# Patient Record
Sex: MALE | Race: WHITE | NOT HISPANIC OR LATINO
[De-identification: names, ages, dates, MRNs, and addresses within clinical notes are randomized per-mention and may not be internally consistent; named-entity substitution may affect disease eponyms.]

---

## 2021-10-20 ENCOUNTER — TRANSCRIPTION ENCOUNTER (OUTPATIENT)
Age: 76
End: 2021-10-20

## 2021-10-26 ENCOUNTER — TRANSCRIPTION ENCOUNTER (OUTPATIENT)
Age: 76
End: 2021-10-26

## 2023-03-13 PROBLEM — Z00.00 ENCOUNTER FOR PREVENTIVE HEALTH EXAMINATION: Status: ACTIVE | Noted: 2023-03-13

## 2023-03-14 ENCOUNTER — APPOINTMENT (OUTPATIENT)
Dept: OTOLARYNGOLOGY | Facility: CLINIC | Age: 78
End: 2023-03-14

## 2023-03-30 ENCOUNTER — APPOINTMENT (OUTPATIENT)
Dept: OTOLARYNGOLOGY | Facility: CLINIC | Age: 78
End: 2023-03-30
Payer: MEDICARE

## 2023-03-30 VITALS — BODY MASS INDEX: 28.32 KG/M2 | HEIGHT: 65 IN | WEIGHT: 170 LBS

## 2023-03-30 PROCEDURE — 31231 NASAL ENDOSCOPY DX: CPT

## 2023-03-30 PROCEDURE — 92557 COMPREHENSIVE HEARING TEST: CPT

## 2023-03-30 PROCEDURE — 92567 TYMPANOMETRY: CPT

## 2023-03-30 PROCEDURE — 99204 OFFICE O/P NEW MOD 45 MIN: CPT | Mod: 25

## 2023-03-30 PROCEDURE — G0268 REMOVAL OF IMPACTED WAX MD: CPT

## 2023-04-05 LAB — EAR NOSE AND THROAT CULTURE: ABNORMAL

## 2023-04-05 NOTE — HISTORY OF PRESENT ILLNESS
[de-identified] : HANNAH SOLOMON is a 78 year old male who comes in complaining of a decrease in hearing in his left ear.  [facility] and [facility] which shows a left-sided acoustic neuroma.  There is a mildly expanded left IAC with trace medial extension through the porous acoustic us.  The largest dimension is 9.6 mm.  He denies any significant vestibular dysfunction.  He complains as well of nasal congestion and the need to clear his throat.  This is more problematic as a valencia.  The MRI report shows maxillary ethmoid and frontal sinus disease with complete opacification of the ethmoids and the right frontal sinus.  The patient had no other ear nose or throat complaints at this visit.

## 2023-04-05 NOTE — ASSESSMENT
[FreeTextEntry1] : It was my impression that he has a left-sided acoustic neuroma, with the largest dimension just under 1 cm.  He has chosen to observe and I felt that made the most sense especially as he already had a 12% discrimination and he will get [facility] over [facility].  Options for treatment were reviewed with him.\par \par He has the complaints of the thick mucus in the throat and his imaging that showed a significant sinusitis.  His exam showed purulence from the left and early polypoid changes.  I recommended using both fluticasone and nasal saline rinses regularly, he does tend to do this already.  I took a culture and suggested a course of cefuroxime pending the culture result.  I would like to see him back in follow-up in 3 to 4 weeks.\par \par As a valencia, I suggested avoiding antihistamines although he could use azelastine if he truly has allergies.  I recommended guaifenesin to help thin the mucus.  We discussed the option of a brief course of prednisone but will hold off\par

## 2023-04-05 NOTE — PHYSICAL EXAM
[FreeTextEntry1] : \par The patient was alert and oriented and in no distress.\par Voice was clear.\par \par Face:\par The patient had no facial asymmetry or mass.\par The skin was unremarkable.\par \par Eyes:\par The pupils were equal round and reactive to light and accommodation.\par There was no significant nystagmus or disconjugate gaze noted.\par \par Nose: \par The external nose had no significant deformity.  There was no facial tenderness.  On anterior rhinoscopy, the nasal mucosa was clear.  The anterior septum was midline.  There were no visualized polyps purulence  or masses.\par \par Oral cavity:\par The oral mucosa was normal.\par The oral and base of tongue were clear and without mass.\par The gingival and buccal mucosa were moist and without lesions.\par The palate moved well.\par There was no cleft to the palate.\par There appeared to be good salivary flow.  \par There was no pus, erythema or mass in the oral cavity.\par \par \par Ears:\par  Procedure note:\par  Removal of Cerumen Impactions, both ears:  48545-47\par Both external ears were normal.\par There were symptomatic cerumen impactions in both ears.  These were cleared microscopically without trauma using both suction and curettes.  After clearing,  both ear canals were clear both eardrums were intact and mobile.\par Neck: \par The neck was symmetrical.\par The parotid and submandibular glands were normal without masses.\par The trachea was midline and there was no unusual crepitus.\par The thyroid was smooth and nontender and no masses were palpated.\par There was no significant cervical adenopathy.\par \par \par Neuro:\par Neurologically, the patient was awake, alert, and oriented to person, place and time. There were no obvious focal neurologic abnormalities.  Cranial nerves II through XII were grossly intact.\par \par \par TMJ:\par The temporomandibular joints were nontender.\par There was no abnormal crepitus and no significant malocclusion [de-identified] : Ears:\par Procedure note: Debridement of cerumen impaction left ear   49262\par \par Dx:  symptomatic cerumen impaction left ear \par Both external ears were normal.\par There was a dense cerumen impaction in the left ear.  This was cleared microscopically without trauma, using suction and curettes.  Beyond that, both ear canals were clear and both eardrums were intact and mobile.\par \par Nasal endoscopy: \par CPT 44948\par Procedure Note:\par \par Endoscopy was done with Covid precautions and with video. All risks and benefits were discussed with the patient and consent obtained.\par \par Nasal endoscopy was done with topical anesthesia of Pontocaine and Afrin and a      nasal endoscope.\par Indication: Nasal congestion, rule out sinusitis.\par Procedure: The nasal cavity was anesthetized with topical Afrin and Pontocaine. An  endoscope was used and inserted into the nasal cavity.\par Attention was first paid to the anterior nasal cavity.\par Endocoscopy was performed to inspect the interior of the nasal cavity, the nasal septum,  the middle and superior meati, the inferior, middle and superior turbinates, and the spheno-ethmoidal  recesses, the nasopharynx and eustachian tube orifices bilaterally. including the nasal mocosa, the possibility of polyps and the consistency of the nasal mucous.\par All findings were normal except:\par The nasal mucosa was boggy with a sharp S-shaped septal deflection.  There were polypoid changes in the left middle meatus greater than the right and a thick yellow discharge in the left middle meatus.  Switching to a rigid 0 degree endoscope the left side was cultured\par \par Flexible fiberoptic laryngoscopy: CPT 33037\par Indications: Dysphagia\par Procedure note:\par \par Flexible fiberoptic laryngoscopy was performed because of dysphagia and because of the patient's inability to tolerate adequate mirror examination.\par \par The nasal cavity was anesthetized with Pontocaine and Afrin.\par The flexible endoscope was placed into the patient's nasal cavity.\par The nasopharynx was without masses.\par The oropharynx, vallecula and base of tongue had no masses.\par The epiglottis, aryepiglottic folds and false vocal cords were normal.\par The pyriform sinuses were without mucosal lesions or pooling of secretions.  \par The laryngeal ventricles were without lesions.\par The visualized subglottis was without mass.\par The lateral and posterior pharyngeal walls were clear and symmetrical.\par The vocal folds were clear and mobile; they abducted and adducted normally.\par There was no interarytenoid mass or fullness.\par \par Endoscopy was done with Covid precautions and with video. All risks and benefits were discussed with the patient and consent obtained.  His base of tongue is soft to palpation.   A complete audiogram was ordered, done and reviewed with the patient.  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

## 2023-04-18 ENCOUNTER — APPOINTMENT (OUTPATIENT)
Dept: OTOLARYNGOLOGY | Facility: CLINIC | Age: 78
End: 2023-04-18
Payer: MEDICARE

## 2023-04-18 VITALS — BODY MASS INDEX: 28.32 KG/M2 | WEIGHT: 170 LBS | HEIGHT: 65 IN

## 2023-04-18 PROCEDURE — 99214 OFFICE O/P EST MOD 30 MIN: CPT | Mod: 25

## 2023-04-18 PROCEDURE — 31231 NASAL ENDOSCOPY DX: CPT

## 2023-04-18 NOTE — HISTORY OF PRESENT ILLNESS
[de-identified] : HANNAH SOLOMON was seen in follow-up on April 18.  Since I had seen him, he got a cross hearing aid.  He has the history of the left-sided acoustic neuroma on imaging at 9.6 mm.  When I saw him last he had a purulent sinusitis and had sinusitis on his MRI.  He notes the nasal congestion and throat clearing are better and that guaifenesin is helping.  The patient had no other ear nose or throat complaints at this visit.

## 2023-04-18 NOTE — HISTORY OF PRESENT ILLNESS
[de-identified] : HANNAH SOLOMON was seen in follow-up on April 18.  Since I had seen him, he got a cross hearing aid.  He has the history of the left-sided acoustic neuroma on imaging at 9.6 mm.  When I saw him last he had a purulent sinusitis and had sinusitis on his MRI.  He notes the nasal congestion and throat clearing are better and that guaifenesin is helping.  The patient had no other ear nose or throat complaints at this visit.

## 2023-04-18 NOTE — PHYSICAL EXAM
[FreeTextEntry1] : General:\par The patient was alert and oriented and in no distress.\par Voice was clear.  He was still clearing his throat but not as frequently\par He is using a cross hearing aid from the left to right\par \par Oral cavity:\par The oral mucosa was normal.\par The oral and base of tongue were clear and without mass.\par The gingival and buccal mucosa were moist and without lesions.\par The palate moved well.\par There was no cleft to the palate.\par There appeared to be good salivary flow.  \par There was no pus, erythema or mass in the oral cavity.\par \par Neck: \par The neck was symmetrical.\par The parotid and submandibular glands were normal without masses.\par The trachea was midline and there was no unusual crepitus.\par The thyroid was smooth and nontender and no masses were palpated.\par There was no significant cervical adenopathy.\par \par Neuro:\par Neurologically, the patient was awake, alert, and oriented to person, place and time. There were no obvious focal neurologic abnormalities.  Cranial nerves II through XII were grossly intact. [de-identified] : Nasal endoscopy: \par CPT 45831\par Procedure Note:\par \par Endoscopy was done with Covid precautions and with video. All risks and benefits were discussed with the patient and consent obtained.\par \par Nasal endoscopy was done with topical anesthesia of Pontocaine and Afrin and a      nasal endoscope.\par Indication: Nasal congestion, rule out sinusitis.\par Procedure: The nasal cavity was anesthetized with topical Afrin and Pontocaine. An  endoscope was used and inserted into the nasal cavity.\par Attention was first paid to the anterior nasal cavity.\par Endocoscopy was performed to inspect the interior of the nasal cavity, the nasal septum,  the middle and superior meati, the inferior, middle and superior turbinates, and the spheno-ethmoidal  recesses, the nasopharynx and eustachian tube orifices bilaterally. including the nasal mocosa, the possibility of polyps and the consistency of the nasal mucous.\par All findings were normal except:\par The nasal mucosa remains boggy.  There were polyps in both middle meati with thick but now clear mucus primarily from the left middle meatus

## 2023-04-18 NOTE — PHYSICAL EXAM
[FreeTextEntry1] : General:\par The patient was alert and oriented and in no distress.\par Voice was clear.  He was still clearing his throat but not as frequently\par He is using a cross hearing aid from the left to right\par \par Oral cavity:\par The oral mucosa was normal.\par The oral and base of tongue were clear and without mass.\par The gingival and buccal mucosa were moist and without lesions.\par The palate moved well.\par There was no cleft to the palate.\par There appeared to be good salivary flow.  \par There was no pus, erythema or mass in the oral cavity.\par \par Neck: \par The neck was symmetrical.\par The parotid and submandibular glands were normal without masses.\par The trachea was midline and there was no unusual crepitus.\par The thyroid was smooth and nontender and no masses were palpated.\par There was no significant cervical adenopathy.\par \par Neuro:\par Neurologically, the patient was awake, alert, and oriented to person, place and time. There were no obvious focal neurologic abnormalities.  Cranial nerves II through XII were grossly intact. [de-identified] : Nasal endoscopy: \par CPT 74315\par Procedure Note:\par \par Endoscopy was done with Covid precautions and with video. All risks and benefits were discussed with the patient and consent obtained.\par \par Nasal endoscopy was done with topical anesthesia of Pontocaine and Afrin and a      nasal endoscope.\par Indication: Nasal congestion, rule out sinusitis.\par Procedure: The nasal cavity was anesthetized with topical Afrin and Pontocaine. An  endoscope was used and inserted into the nasal cavity.\par Attention was first paid to the anterior nasal cavity.\par Endocoscopy was performed to inspect the interior of the nasal cavity, the nasal septum,  the middle and superior meati, the inferior, middle and superior turbinates, and the spheno-ethmoidal  recesses, the nasopharynx and eustachian tube orifices bilaterally. including the nasal mocosa, the possibility of polyps and the consistency of the nasal mucous.\par All findings were normal except:\par The nasal mucosa remains boggy.  There were polyps in both middle meati with thick but now clear mucus primarily from the left middle meatus

## 2023-04-18 NOTE — ASSESSMENT
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  At this point, he was feeling much better however.  In any case, I recommended a course of prednisone for 5 days–risks and benefits and suggested continuing on both the nasal steroid spray and his sinus lavage and would like to see him back in follow-up.  I am not certain that I would recommend further aggressive treatment if he feels well and is doing well.

## 2023-05-18 ENCOUNTER — APPOINTMENT (OUTPATIENT)
Dept: OTOLARYNGOLOGY | Facility: CLINIC | Age: 78
End: 2023-05-18
Payer: MEDICARE

## 2023-05-18 PROCEDURE — 31231 NASAL ENDOSCOPY DX: CPT

## 2023-05-18 PROCEDURE — 99214 OFFICE O/P EST MOD 30 MIN: CPT | Mod: 25

## 2023-05-18 PROCEDURE — 69210 REMOVE IMPACTED EAR WAX UNI: CPT

## 2023-05-18 RX ORDER — CEFUROXIME AXETIL 250 MG/1
250 TABLET ORAL
Qty: 20 | Refills: 1 | Status: COMPLETED | COMMUNITY
Start: 2023-03-30 | End: 2023-05-18

## 2023-05-18 RX ORDER — PREDNISONE 20 MG/1
20 TABLET ORAL
Qty: 10 | Refills: 0 | Status: COMPLETED | COMMUNITY
Start: 2023-04-18 | End: 2023-05-18

## 2023-05-18 NOTE — ASSESSMENT
[FreeTextEntry1] : It was my impression that the patient had a cerumen impaction that was cleared.  I recommended topical moisturizing.  I recommended avoiding Q-tips.  I reviewed aural hygiene and the role of cerumen with the patient.  I suggested a repeat visit in a year or earlier should the need arise.\par His polypoid sinusitis had responded to medical therapy but I recommended continuing to use his nasal steroids and would like to reevaluate in 6 months to make sure this does not recur\par He has the profound hearing loss in the left ear and is using CROS aids that are ITE.  I explained that may be why the right ear feels worse with the hearing aids.  I suggested having his hearing aids evaluated and adjusted as needed.

## 2023-05-18 NOTE — PHYSICAL EXAM
[FreeTextEntry1] : \par The patient was alert and oriented and in no distress.\par Voice was clear.\par \par Face:\par The patient had no facial asymmetry or mass.\par The skin was unremarkable.\par \par Eyes:\par The pupils were equal round and reactive to light and accommodation.\par There was no significant nystagmus or disconjugate gaze noted.\par \par Nose: \par The external nose had no significant deformity.  There was no facial tenderness.  On anterior rhinoscopy, the nasal mucosa was clear.  The anterior septum was midline.  There were no visualized polyps purulence  or masses.\par \par Oral cavity:\par The oral mucosa was normal.\par The oral and base of tongue were clear and without mass.\par The gingival and buccal mucosa were moist and without lesions.\par The palate moved well.\par There was no cleft to the palate.\par There appeared to be good salivary flow.  \par There was no pus, erythema or mass in the oral cavity.\par \par \par Ears:\par  Procedure note:\par  Removal of Cerumen Impactions, both ears:  14352-26\par Both external ears were normal.\par There were symptomatic cerumen impactions in both ears.  These were cleared microscopically without trauma using both suction and curettes.  After clearing,  both ear canals were clear both eardrums were intact and mobile.\par \par \par Neck: \par The neck was symmetrical.\par The parotid and submandibular glands were normal without masses.\par The trachea was midline and there was no unusual crepitus.\par The thyroid was smooth and nontender and no masses were palpated.\par There was no significant cervical adenopathy.\par \par \par Neuro:\par Neurologically, the patient was awake, alert, and oriented to person, place and time. There were no obvious focal neurologic abnormalities.  Cranial nerves II through XII were grossly intact.\par \par \par TMJ:\par The temporomandibular joints were nontender.\par There was no abnormal crepitus and no significant malocclusion\par \par \par Nasal endoscopy: \par CPT 05542\par Procedure Note:\par \par Endoscopy was done with Covid precautions and with video. All risks and benefits were discussed with the patient and consent obtained.\par \par Nasal endoscopy was done with topical anesthesia of Pontocaine and Afrin and a      nasal endoscope.\par Indication: Sinusitis with nasal polyps evaluation of response to therapy procedure: The nasal cavity was anesthetized with topical Afrin and Pontocaine. An  endoscope was used and inserted into the nasal cavity.\par Attention was first paid to the anterior nasal cavity.\par Endocoscopy was performed to inspect the interior of the nasal cavity, the nasal septum,  the middle and superior meati, the inferior, middle and superior turbinates, and the spheno-ethmoidal  recesses, the nasopharynx and eustachian tube orifices bilaterally. including the nasal mocosa, the possibility of polyps and the consistency of the nasal mucous.\par All findings were normal except:\par The mucosa still was somewhat boggy but the polypoid changes had resolved and there was no further purulence\par

## 2023-05-18 NOTE — HISTORY OF PRESENT ILLNESS
[de-identified] : HANNAH SOLOMON was seen in follow-up on May 18.  He was doing much better.  His nasal airway was improved the postnasal drip was better and he had no problems with the brief course of prednisone.  He though, is concerned about his hearing aids and finds that he hears better when he takes his right hearing aid out.  The patient had no other ear nose or throat complaints at this visit.

## 2023-10-05 ENCOUNTER — APPOINTMENT (OUTPATIENT)
Dept: OTOLARYNGOLOGY | Facility: CLINIC | Age: 78
End: 2023-10-05
Payer: MEDICARE

## 2023-10-05 VITALS — BODY MASS INDEX: 28.32 KG/M2 | WEIGHT: 170 LBS | HEIGHT: 65 IN

## 2023-10-05 DIAGNOSIS — Z86.39 PERSONAL HISTORY OF OTHER ENDOCRINE, NUTRITIONAL AND METABOLIC DISEASE: ICD-10-CM

## 2023-10-05 PROCEDURE — 31231 NASAL ENDOSCOPY DX: CPT

## 2023-10-05 PROCEDURE — 99213 OFFICE O/P EST LOW 20 MIN: CPT | Mod: 25

## 2023-10-05 RX ORDER — FINASTERIDE 1 MG/1
TABLET ORAL
Refills: 0 | Status: ACTIVE | COMMUNITY

## 2023-10-05 RX ORDER — DULOXETINE HYDROCHLORIDE 30 MG/1
CAPSULE, DELAYED RELEASE ORAL
Refills: 0 | Status: ACTIVE | COMMUNITY

## 2023-10-05 RX ORDER — ROSUVASTATIN CALCIUM 5 MG/1
TABLET, FILM COATED ORAL
Refills: 0 | Status: ACTIVE | COMMUNITY

## 2023-10-05 RX ORDER — GUAIFENESIN 400 MG/1
TABLET ORAL
Refills: 0 | Status: ACTIVE | COMMUNITY

## 2023-10-05 RX ORDER — BUPROPION HYDROCHLORIDE 100 MG/1
TABLET, FILM COATED ORAL
Refills: 0 | Status: ACTIVE | COMMUNITY

## 2023-10-05 RX ORDER — TAMSULOSIN HYDROCHLORIDE 0.4 MG/1
CAPSULE ORAL
Refills: 0 | Status: ACTIVE | COMMUNITY

## 2023-10-05 RX ORDER — BUDESONIDE 0.5 MG/2ML
0.5 INHALANT ORAL TWICE DAILY
Qty: 6 | Refills: 3 | Status: ACTIVE | COMMUNITY
Start: 2023-10-05 | End: 1900-01-01

## 2023-11-02 ENCOUNTER — APPOINTMENT (OUTPATIENT)
Dept: OTOLARYNGOLOGY | Facility: CLINIC | Age: 78
End: 2023-11-02
Payer: MEDICARE

## 2023-11-02 VITALS — BODY MASS INDEX: 28.32 KG/M2 | WEIGHT: 170 LBS | HEIGHT: 65 IN

## 2023-11-02 DIAGNOSIS — H61.23 IMPACTED CERUMEN, BILATERAL: ICD-10-CM

## 2023-11-02 PROCEDURE — 31231 NASAL ENDOSCOPY DX: CPT

## 2023-11-02 PROCEDURE — 99214 OFFICE O/P EST MOD 30 MIN: CPT | Mod: 25

## 2023-11-02 PROCEDURE — 69210 REMOVE IMPACTED EAR WAX UNI: CPT

## 2023-11-07 ENCOUNTER — OUTPATIENT (OUTPATIENT)
Dept: OUTPATIENT SERVICES | Facility: HOSPITAL | Age: 78
LOS: 1 days | End: 2023-11-07

## 2023-11-07 ENCOUNTER — APPOINTMENT (OUTPATIENT)
Dept: CT IMAGING | Facility: CLINIC | Age: 78
End: 2023-11-07
Payer: MEDICARE

## 2023-11-07 PROCEDURE — 70486 CT MAXILLOFACIAL W/O DYE: CPT | Mod: 26,MH

## 2023-11-28 ENCOUNTER — APPOINTMENT (OUTPATIENT)
Dept: OTOLARYNGOLOGY | Facility: CLINIC | Age: 78
End: 2023-11-28
Payer: MEDICARE

## 2023-11-28 DIAGNOSIS — J32.9 CHRONIC SINUSITIS, UNSPECIFIED: ICD-10-CM

## 2023-11-28 PROCEDURE — 99214 OFFICE O/P EST MOD 30 MIN: CPT | Mod: 25

## 2023-11-28 PROCEDURE — 31231 NASAL ENDOSCOPY DX: CPT

## 2023-12-04 ENCOUNTER — APPOINTMENT (OUTPATIENT)
Dept: OPHTHALMOLOGY | Facility: CLINIC | Age: 78
End: 2023-12-04
Payer: MEDICARE

## 2023-12-04 ENCOUNTER — NON-APPOINTMENT (OUTPATIENT)
Age: 78
End: 2023-12-04

## 2023-12-04 PROCEDURE — 92004 COMPRE OPH EXAM NEW PT 1/>: CPT

## 2023-12-04 PROCEDURE — 92136 OPHTHALMIC BIOMETRY: CPT

## 2023-12-11 ENCOUNTER — NON-APPOINTMENT (OUTPATIENT)
Age: 78
End: 2023-12-11

## 2023-12-11 ENCOUNTER — APPOINTMENT (OUTPATIENT)
Dept: OPHTHALMOLOGY | Facility: CLINIC | Age: 78
End: 2023-12-11
Payer: MEDICARE

## 2023-12-11 PROCEDURE — 92012 INTRM OPH EXAM EST PATIENT: CPT

## 2024-01-10 ENCOUNTER — TRANSCRIPTION ENCOUNTER (OUTPATIENT)
Age: 79
End: 2024-01-10

## 2024-01-10 RX ORDER — ACETAMINOPHEN AND CODEINE PHOSPHATE 300; 15 MG/1; MG/1
300-15 TABLET ORAL
Qty: 12 | Refills: 0 | Status: ACTIVE | COMMUNITY
Start: 2024-01-10 | End: 1900-01-01

## 2024-01-10 RX ORDER — PREDNISONE 20 MG/1
20 TABLET ORAL
Qty: 16 | Refills: 0 | Status: ACTIVE | COMMUNITY
Start: 2023-10-05 | End: 1900-01-01

## 2024-01-10 RX ORDER — CEFUROXIME AXETIL 250 MG/1
250 TABLET ORAL
Qty: 20 | Refills: 1 | Status: ACTIVE | COMMUNITY
Start: 2024-01-10 | End: 1900-01-01

## 2024-01-10 NOTE — ASU PATIENT PROFILE, ADULT - NSICDXPASTMEDICALHX_GEN_ALL_CORE_FT
PAST MEDICAL HISTORY:  CAD (coronary artery disease)     CRD (chronic renal disease), stage III     Hearing loss left ear    History of BPH     Hypertension     Nasal polyps     Osteoarthritis left knee     PAST MEDICAL HISTORY:  Bilateral cataracts     CAD (coronary artery disease)     CRD (chronic renal disease), stage III     Hearing loss left ear    History of BPH     History of sinusitis     Hypertension     Nasal polyps     Osteoarthritis left knee

## 2024-01-10 NOTE — ASU PATIENT PROFILE, ADULT - FALL HARM RISK - UNIVERSAL INTERVENTIONS
Bed in lowest position, wheels locked, appropriate side rails in place/Call bell, personal items and telephone in reach/Instruct patient to call for assistance before getting out of bed or chair/Non-slip footwear when patient is out of bed/[place] to call system/Physically safe environment - no spills, clutter or unnecessary equipment/Purposeful Proactive Rounding/Room/bathroom lighting operational, light cord in reach Bed in lowest position, wheels locked, appropriate side rails in place/Call bell, personal items and telephone in reach/Instruct patient to call for assistance before getting out of bed or chair/Non-slip footwear when patient is out of bed/[place] to call system/Physically safe environment - no spills, clutter or unnecessary equipment/Purposeful Proactive Rounding/Room/bathroom lighting operational, light cord in reach

## 2024-01-10 NOTE — ASU PATIENT PROFILE, ADULT - NS PREOP UNDERSTANDS INFO
no solid food for 8 hours before surgery, no chewing gums or hard candy while NPO/ wear loose comfortable fitting  clothes/ no lotions/ perfume/ jewelry or make up/yes

## 2024-01-11 ENCOUNTER — RESULT REVIEW (OUTPATIENT)
Age: 79
End: 2024-01-11

## 2024-01-11 ENCOUNTER — TRANSCRIPTION ENCOUNTER (OUTPATIENT)
Age: 79
End: 2024-01-11

## 2024-01-11 ENCOUNTER — OUTPATIENT (OUTPATIENT)
Dept: OUTPATIENT SERVICES | Facility: HOSPITAL | Age: 79
LOS: 1 days | Discharge: HOME | End: 2024-01-11
Payer: MEDICARE

## 2024-01-11 ENCOUNTER — APPOINTMENT (OUTPATIENT)
Dept: OTOLARYNGOLOGY | Facility: AMBULATORY SURGERY CENTER | Age: 79
End: 2024-01-11

## 2024-01-11 VITALS
HEART RATE: 58 BPM | RESPIRATION RATE: 16 BRPM | OXYGEN SATURATION: 97 % | DIASTOLIC BLOOD PRESSURE: 93 MMHG | TEMPERATURE: 98 F | HEIGHT: 65 IN | SYSTOLIC BLOOD PRESSURE: 177 MMHG | WEIGHT: 173.5 LBS

## 2024-01-11 VITALS
TEMPERATURE: 98 F | DIASTOLIC BLOOD PRESSURE: 71 MMHG | HEART RATE: 71 BPM | OXYGEN SATURATION: 97 % | SYSTOLIC BLOOD PRESSURE: 129 MMHG | RESPIRATION RATE: 16 BRPM

## 2024-01-11 DIAGNOSIS — Z95.5 PRESENCE OF CORONARY ANGIOPLASTY IMPLANT AND GRAFT: Chronic | ICD-10-CM

## 2024-01-11 DIAGNOSIS — Z90.89 ACQUIRED ABSENCE OF OTHER ORGANS: Chronic | ICD-10-CM

## 2024-01-11 PROCEDURE — 30520 REPAIR OF NASAL SEPTUM: CPT

## 2024-01-11 PROCEDURE — 61782 SCAN PROC CRANIAL EXTRA: CPT

## 2024-01-11 PROCEDURE — 30140 RESECT INFERIOR TURBINATE: CPT | Mod: 50

## 2024-01-11 PROCEDURE — 31276 NSL/SINS NDSC FRNT TISS RMVL: CPT | Mod: 50

## 2024-01-11 PROCEDURE — 88311 DECALCIFY TISSUE: CPT | Mod: 26

## 2024-01-11 PROCEDURE — 31267 ENDOSCOPY MAXILLARY SINUS: CPT | Mod: 50

## 2024-01-11 PROCEDURE — 88305 TISSUE EXAM BY PATHOLOGIST: CPT | Mod: 26

## 2024-01-11 PROCEDURE — 31259 NSL/SINS NDSC SPHN TISS RMVL: CPT | Mod: 50

## 2024-01-11 DEVICE — STENT DRUG ELUTING SINUS INTERSECT ENT PROPEL MINI 16MM: Type: IMPLANTABLE DEVICE | Status: FUNCTIONAL

## 2024-01-11 DEVICE — STAPLER SEPTAL ENTACT: Type: IMPLANTABLE DEVICE | Status: FUNCTIONAL

## 2024-01-11 DEVICE — PACKING AND STENT NOVAPACK NASAL SINUS: Type: IMPLANTABLE DEVICE | Status: FUNCTIONAL

## 2024-01-11 RX ORDER — ROSUVASTATIN CALCIUM 5 MG/1
1 TABLET ORAL
Refills: 0 | DISCHARGE

## 2024-01-11 RX ORDER — SODIUM CHLORIDE 9 MG/ML
500 INJECTION, SOLUTION INTRAVENOUS ONCE
Refills: 0 | Status: COMPLETED | OUTPATIENT
Start: 2024-01-11 | End: 2024-01-11

## 2024-01-11 RX ORDER — OXYCODONE HYDROCHLORIDE 5 MG/1
5 TABLET ORAL ONCE
Refills: 0 | Status: DISCONTINUED | OUTPATIENT
Start: 2024-01-11 | End: 2024-01-11

## 2024-01-11 RX ORDER — LISINOPRIL/HYDROCHLOROTHIAZIDE 10-12.5 MG
1 TABLET ORAL
Refills: 0 | DISCHARGE

## 2024-01-11 RX ORDER — CARVEDILOL PHOSPHATE 80 MG/1
1 CAPSULE, EXTENDED RELEASE ORAL
Refills: 0 | DISCHARGE

## 2024-01-11 RX ORDER — DULOXETINE HYDROCHLORIDE 30 MG/1
1 CAPSULE, DELAYED RELEASE ORAL
Refills: 0 | DISCHARGE

## 2024-01-11 RX ORDER — FENTANYL CITRATE 50 UG/ML
25 INJECTION INTRAVENOUS
Refills: 0 | Status: DISCONTINUED | OUTPATIENT
Start: 2024-01-11 | End: 2024-01-11

## 2024-01-11 RX ORDER — BUPROPION HYDROCHLORIDE 150 MG/1
1 TABLET, EXTENDED RELEASE ORAL
Refills: 0 | DISCHARGE

## 2024-01-11 RX ORDER — SODIUM CHLORIDE 9 MG/ML
500 INJECTION, SOLUTION INTRAVENOUS
Refills: 0 | Status: DISCONTINUED | OUTPATIENT
Start: 2024-01-11 | End: 2024-01-11

## 2024-01-11 RX ORDER — FINASTERIDE 5 MG/1
1 TABLET, FILM COATED ORAL
Refills: 0 | DISCHARGE

## 2024-01-11 RX ORDER — OXYMETAZOLINE HYDROCHLORIDE 0.5 MG/ML
2 SPRAY NASAL ONCE
Refills: 0 | Status: COMPLETED | OUTPATIENT
Start: 2024-01-11 | End: 2024-01-11

## 2024-01-11 RX ORDER — BUDESONIDE, MICRONIZED 100 %
2 POWDER (GRAM) MISCELLANEOUS
Refills: 0 | DISCHARGE

## 2024-01-11 RX ORDER — FLUTICASONE PROPIONATE 220 MCG
1 AEROSOL WITH ADAPTER (GRAM) INHALATION
Refills: 0 | DISCHARGE

## 2024-01-11 RX ORDER — HYDROMORPHONE HYDROCHLORIDE 2 MG/ML
0.25 INJECTION INTRAMUSCULAR; INTRAVENOUS; SUBCUTANEOUS
Refills: 0 | Status: DISCONTINUED | OUTPATIENT
Start: 2024-01-11 | End: 2024-01-11

## 2024-01-11 RX ADMIN — OXYMETAZOLINE HYDROCHLORIDE 2 SPRAY(S): 0.5 SPRAY NASAL at 13:55

## 2024-01-11 RX ADMIN — SODIUM CHLORIDE 500 MILLILITER(S): 9 INJECTION, SOLUTION INTRAVENOUS at 17:00

## 2024-01-11 NOTE — ASU DISCHARGE PLAN (ADULT/PEDIATRIC) - NS MD DC FALL RISK RISK
For information on Fall & Injury Prevention, visit: [email or web address] OR  [email or web address] OR  https://www.cdc.gov/steadi/patient.html For information on Fall & Injury Prevention, visit: [email or web address] OR  [email or web address] OR  https://www.cdc.gov/steadi/patient.html

## 2024-01-11 NOTE — PRE-ANESTHESIA EVALUATION ADULT - NSANTHADDINFOFT_GEN_ALL_CORE
Pt accepts all anesthesia risks IBNLT: Dental, Pharyngeal, Laryngeal, Tracheal Trauma, Sore Throat, Hoarseness, Recurrent Laryngeal Nerve Dysfunction, Upper and Lower Extremity Paresthesias, Nausea and Vomiting, Potential exacerbation of asthma and VANNESA.

## 2024-01-12 NOTE — PHYSICAL EXAM
[FreeTextEntry1] : General: The patient was alert and oriented and in no distress. Voice was clear.  He sounded moderately congested  Oral cavity: The oral mucosa was normal. The oral and base of tongue were clear and without mass. The gingival and buccal mucosa were moist and without lesions. The palate moved well. There was no cleft to the palate. There appeared to be good salivary flow.   There was no pus, erythema or mass in the oral cavity.   Nasal endoscopy:  CPT 06888 Procedure Note:  Endoscopy was done with Covid precautions and with video. All risks and benefits were discussed with the patient and consent obtained.  Nasal endoscopy was done with topical anesthesia of Pontocaine and Afrin and a      nasal endoscope. Indication: Nasal congestion, rule out sinusitis. Procedure: The nasal cavity was anesthetized with topical Afrin and Pontocaine. An  endoscope was used and inserted into the nasal cavity. Attention was first paid to the anterior nasal cavity. Endocoscopy was performed to inspect the interior of the nasal cavity, the nasal septum,  the middle and superior meati, the inferior, middle and superior turbinates, and the spheno-ethmoidal  recesses, the nasopharynx and eustachian tube orifices bilaterally. including the nasal mocosa, the possibility of polyps and the consistency of the nasal mucous. All findings were normal except: This was done with a CT for guidance. This showed the nasal mucosa was boggy.  He had inferior turbinate hypertrophy.  He has a right septal deflection and pansinusitis with pansinus opacification on imaging and polyps extending to the lower level of the middle turbinates bilaterally 
(1) constantly moist

## 2024-01-12 NOTE — ADDENDUM
[FreeTextEntry1] : endoscopic sinus surgery, septoplasty and turbinate reduction on 1/11/24.   Finding of right septal deflection (septoplasty only and polyps filling aprx 50 % of nasal airway and all of the sinuses.  Sphenoid opened widely trans ethmoidally with polyp resection and signficant polypoid changes in both antra removed with 30 degree shaver- as well as significant frontal and all ethmoids.   no complications-  mini propels bilaterally-   1/12/24.  phone call-  message left.   RLP

## 2024-01-12 NOTE — HISTORY OF PRESENT ILLNESS
[de-identified] : HANNAH SOLOMON was seen in follow-up on November 28.  Since I had seen him he had sinus imaging and comes in for repeat evaluation.  He is using the budesonide nasal spray but was unable to get budesonide for rinsing.  He comes in for repeat evaluation

## 2024-01-12 NOTE — ASSESSMENT
[FreeTextEntry1] : It was my impression that he has a pansinusitis with pansinus and nasal polyposis in spite of aggressive medical management.  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  I would recommend endoscopic sinus surgery including sphenoidotomies with endoscopic polyp resections septoplasty and inferior turbinate reductions.  All risks and benefits were discussed with the patient at length including the possibility or even probability of recurrence.  The need to continue on medication after his procedure and I would probably pretreat with steroids.  The risk of visual loss and CSF leak were also reviewed as well as hemorrhage.  I will put him back on budesonide nasal spray after surgery and he may need to get the budesonide rinse or even Dupixent depending on his course

## 2024-01-16 ENCOUNTER — APPOINTMENT (OUTPATIENT)
Dept: OTOLARYNGOLOGY | Facility: CLINIC | Age: 79
End: 2024-01-16
Payer: MEDICARE

## 2024-01-16 VITALS — BODY MASS INDEX: 28.32 KG/M2 | WEIGHT: 170 LBS | HEIGHT: 65 IN

## 2024-01-16 DIAGNOSIS — J32.2 CHRONIC ETHMOIDAL SINUSITIS: ICD-10-CM

## 2024-01-16 DIAGNOSIS — J34.3 HYPERTROPHY OF NASAL TURBINATES: ICD-10-CM

## 2024-01-16 DIAGNOSIS — J32.1 CHRONIC FRONTAL SINUSITIS: ICD-10-CM

## 2024-01-16 DIAGNOSIS — J32.3 CHRONIC SPHENOIDAL SINUSITIS: ICD-10-CM

## 2024-01-16 DIAGNOSIS — J34.2 DEVIATED NASAL SEPTUM: ICD-10-CM

## 2024-01-16 DIAGNOSIS — J32.0 CHRONIC MAXILLARY SINUSITIS: ICD-10-CM

## 2024-01-16 PROBLEM — N18.30 CHRONIC KIDNEY DISEASE, STAGE 3 UNSPECIFIED: Chronic | Status: ACTIVE | Noted: 2024-01-11

## 2024-01-16 PROBLEM — I10 ESSENTIAL (PRIMARY) HYPERTENSION: Chronic | Status: ACTIVE | Noted: 2024-01-11

## 2024-01-16 PROBLEM — I25.10 ATHEROSCLEROTIC HEART DISEASE OF NATIVE CORONARY ARTERY WITHOUT ANGINA PECTORIS: Chronic | Status: ACTIVE | Noted: 2024-01-11

## 2024-01-16 PROBLEM — J33.9 NASAL POLYP, UNSPECIFIED: Chronic | Status: ACTIVE | Noted: 2024-01-11

## 2024-01-16 PROBLEM — H26.9 UNSPECIFIED CATARACT: Chronic | Status: ACTIVE | Noted: 2024-01-11

## 2024-01-16 PROBLEM — Z87.09 PERSONAL HISTORY OF OTHER DISEASES OF THE RESPIRATORY SYSTEM: Chronic | Status: ACTIVE | Noted: 2024-01-11

## 2024-01-16 PROBLEM — Z87.438 PERSONAL HISTORY OF OTHER DISEASES OF MALE GENITAL ORGANS: Chronic | Status: ACTIVE | Noted: 2024-01-11

## 2024-01-16 PROBLEM — M19.90 UNSPECIFIED OSTEOARTHRITIS, UNSPECIFIED SITE: Chronic | Status: ACTIVE | Noted: 2024-01-11

## 2024-01-16 PROBLEM — H91.90 UNSPECIFIED HEARING LOSS, UNSPECIFIED EAR: Chronic | Status: ACTIVE | Noted: 2024-01-11

## 2024-01-16 LAB
SURGICAL PATHOLOGY STUDY: SIGNIFICANT CHANGE UP
SURGICAL PATHOLOGY STUDY: SIGNIFICANT CHANGE UP

## 2024-01-16 PROCEDURE — 99024 POSTOP FOLLOW-UP VISIT: CPT

## 2024-01-16 NOTE — HISTORY OF PRESENT ILLNESS
[de-identified] : HANNAH SOLOMON was seen in follow-up on January 16 for his first postoperative debridement.  He is status postendoscopic sinus surgery, septoplasty and turbinate reduction on 1/11/24. Finding of right septal deflection (septoplasty only and polyps filling aprx 50 % of nasal airway and all of the sinuses. Sphenoid opened widely trans ethmoidally with polyp resection and signficant polypoid changes in both antra removed with 30 degree shaver- as well as significant frontal and all ethmoids. no complications- mini propels bilaterally-

## 2024-01-16 NOTE — PHYSICAL EXAM
[FreeTextEntry1] : The patient returns for postoperative sinus debridement CPT 63920-81.  Procedure note:  The nasal cavity was anesthetized topically and locally.  Both rigid 0 and 30 endoscopes were used for debridement.  There was no evidence of hematoma.  Using straight and curved suctions and straight and 30 up-biting forceps, the nasal cavity and sinuses were debrided anteriorly.  The patient appeared to be having an excellent result.  Propel was left in place.  There were no complications.  I placed the patient on nasal steroids and hypertonic saline rinses and will see the patient back in 7-10 days for a repeat middle meatal debridement.  I sent the prescription to [facility] for budesonide to use as a rinse instead when available

## 2024-01-19 RX ORDER — BUDESONIDE 0.5 MG/2ML
0.5 INHALANT ORAL TWICE DAILY
Qty: 60 | Refills: 3 | Status: ACTIVE | COMMUNITY
Start: 2024-01-19 | End: 1900-01-01

## 2024-02-01 ENCOUNTER — APPOINTMENT (OUTPATIENT)
Dept: OTOLARYNGOLOGY | Facility: CLINIC | Age: 79
End: 2024-02-01
Payer: MEDICARE

## 2024-02-01 DIAGNOSIS — J45.909 UNSPECIFIED ASTHMA, UNCOMPLICATED: ICD-10-CM

## 2024-02-01 PROCEDURE — 99024 POSTOP FOLLOW-UP VISIT: CPT

## 2024-02-01 NOTE — PHYSICAL EXAM
[FreeTextEntry1] : General: The patient was alert and oriented and in no distress. Voice was clear.  Face: The patient had no facial asymmetry or mass. The skin was unremarkable.  The patient is status post endoscopic sinus surgery and returns for middle meatal debridement.  Procedure note:  53572-44  The nasal cavity was anesthetized topically and locally.  Using a rigid 0 and a rigid 30 endoscope, using both straight and curved suction iand a straight and the 30 up-biting forceps, both middle meati were suctioned and debrided to clear.  The ethmoids, antra and frontal recesses appeared to be having an excellent result.  The septum was midline and without hematoma.  The inferior turbinates were debrided again.  I recommended continuing on budesonide rinses I recommended a repeat evaluation and cleaning in 3 weeks.

## 2024-02-01 NOTE — HISTORY OF PRESENT ILLNESS
[de-identified] : HANNAH SOLOMON Was seen in follow-up on February 1 for his second postoperative debridement.  He is doing well and notes significant improvement in his airway and symptoms He is status postendoscopic sinus surgery, septoplasty and turbinate reduction on 1/11/24. Finding of right septal deflection (septoplasty only and polyps filling aprx 50 % of nasal airway and all of the sinuses. Sphenoid opened widely trans ethmoidally with polyp resection and signficant polypoid changes in both antra removed with 30 degree shaver- as well as significant frontal and all ethmoids. no complications- mini propels bilaterally-

## 2024-02-13 ENCOUNTER — APPOINTMENT (OUTPATIENT)
Dept: OPHTHALMOLOGY | Facility: AMBULATORY SURGERY CENTER | Age: 79
End: 2024-02-13

## 2024-02-14 ENCOUNTER — APPOINTMENT (OUTPATIENT)
Dept: OPHTHALMOLOGY | Facility: CLINIC | Age: 79
End: 2024-02-14

## 2024-02-20 ENCOUNTER — APPOINTMENT (OUTPATIENT)
Dept: OTOLARYNGOLOGY | Facility: CLINIC | Age: 79
End: 2024-02-20
Payer: MEDICARE

## 2024-02-20 PROCEDURE — 99024 POSTOP FOLLOW-UP VISIT: CPT

## 2024-02-20 NOTE — HISTORY OF PRESENT ILLNESS
[de-identified] : HANNAH SOLOMON Was seen in follow-up on February 20 for his third postoperative debridement.  He is doing well and notes significant improvement in his airway and symptoms He is status postendoscopic sinus surgery, septoplasty and turbinate reduction on 1/11/24. Finding of right septal deflection (septoplasty only and polyps filling aprx 50 % of nasal airway and all of the sinuses. Sphenoid opened widely trans ethmoidally with polyp resection and signficant polypoid changes in both antra removed with 30 degree shaver- as well as significant frontal and all ethmoids. no complications- mini propels bilaterally-

## 2024-02-20 NOTE — PHYSICAL EXAM
[FreeTextEntry1] : General: The patient was alert and oriented and in no distress. Voice was clear.  Face: The patient had no facial asymmetry or mass. The skin was unremarkable.  The patient is status post endoscopic sinus surgery and returns for middle meatal debridement.  Procedure note:  68865-32  The nasal cavity was anesthetized topically and locally.  Using a rigid 0 and a rigid 30 endoscope, using both straight and curved suction iand a straight and the 30 up-biting forceps, both middle meati were suctioned and debrided to clear.  The ethmoids, antra and frontal recesses appeared to be having an excellent result.  The septum was midline and without hematoma.  The inferior turbinates were debrided again.  I recommended continuing on budesonide rinses I recommended a repeat evaluation and cleaning in 6 weeks.

## 2024-02-22 ENCOUNTER — APPOINTMENT (OUTPATIENT)
Dept: OPHTHALMOLOGY | Facility: CLINIC | Age: 79
End: 2024-02-22

## 2024-02-26 NOTE — ASU PATIENT PROFILE, ADULT - NS PREOP UNDERSTANDS INFO
Spoke to patient to be NPO/No solid  foods after  12Mn, allow to drink water  till 6  am , dress comfortable, leave all valuable at home, bring ID photo and insurance cards,  escort arranged address and telephone given to patient/yes

## 2024-02-26 NOTE — ASU PATIENT PROFILE, ADULT - FALL HARM RISK - HARM RISK INTERVENTIONS
[facility] Adult  Hospitalist Group ICU Transfer/Accept Summary This patient is being transferred INTO THE ICU 
DATE OF TRANSFER: 3/5/2020 PATIENT ID: Jasmin Judge MRN: [id number] YOB: 1938 PRIMARY CARE PROVIDER: Valarie Mark MD  
DATE OF ADMISSION: 3/4/2020  9:48 PM   
ATTENDING PHYSICIAN: Laura Bell MD 
CONSULTATIONS:  
IP CONSULT TO CARDIOLOGY 
IP CONSULT TO HEMATOLOGY 
IP CONSULT TO NEPHROLOGY 
IP CONSULT TO INFECTIOUS DISEASES PROCEDURES/SURGERIES:  
* No surgery found * REASON FOR ADMISSION: Acute respiratory failure ([facility] [address] HOSPITAL PROBLEM LIST: 
Patient Active Problem List  
Diagnosis Code  History of colon polyps Z86.010  
 Esophageal dysphagia R13.10  Abnormal x-ray of abdomen R93.5  Adenocarcinoma of esophagus (HCC) C15.9  Acute respiratory failure (HCC) J96.00 Brief HPI and Hospital Course:   
 
60-year-old woman with past medical history significant for adenocarcinoma of the esophagus, hypothyroidism, obstructive sleep apnea, hypertension, chronic kidney disease, and glaucoma was in her usual state of health until about 2 days ago when the patient developed shortness of breath. According to the patient, an attempt was made last week to undergo some form of procedure for the adenocarcinoma of the esophagus. Since after the procedure, the patient has been having progressive shortness of breath as well as lethargy and weakness. The patient described the weakness as generalized weakness. This morning, patient denied any nausea/vomiting, abdominal pain, dysuria. She still complains of shortness of breath at rest.  She states that she has low blood counts and received an injection-(probably erythropoietin). Assessment and Plan: #Septic shock due to gram-negative bacteremia: 
-Patient is immunocompromised with underlying cancer/chemotherapy and has hypotension, blood cultures grew gram-negative rods 4 out of 4 bottles. -Patient's blood pressure remained low despite IV fluids and IV albumin so far. Discussed with critical care physician accepted patient to ICU. She will need pressor support. 
-We will continue IV fluids and IV albumin. 
-On Zosyn. -ID consulted. -UA not significant for any bacteria, her port could have been the source of infection. 
-Obtain CT abdomen when stable. #Acute hypoxic respiratory failure: 
-She is requiring 3 L of oxygen to keep O2 saturations greater than 90% 
-VQ scan rule out pulmonary embolism. Venous Dopplers negative for DVT 
-Chest x-ray this afternoon shows congestion. 
-Echo showed LV hypertrophy, normal EF #Acute on chronic renal failure: 
-baseline Cr 2 per nephrology team 
-Acute component due to sepsis/ATN/losartan PTA 
-on bicarb fluids -Nephrology following 
-strict I and O 
 
#Esophageal cancer: 
-consulted oncology to obtain more information about her treatment course 
-follows with [facility] #Hypothyroidism: 
-on synthroid. #Chronic anemia: 
-Hb 7 today. 
-Blood transfusion held as she has resp distress,would avoid fluid overload now Discussed clinical course so far with patient's daughter, son (Jeronimo)-updated about transfer to ICU. Patient said all three children are her mPOA. Discussed what resuscitation means in case of cardiac or respiratory arrest -she said she wants to be resuscitated. Discussed with . PHYSICAL EXAMINATION: 
Visit Vitals BP (!) 75/42 Pulse 83 Temp 98.6 °F (37 °C) Resp 30 Ht 5' 8\" (1.727 m) Wt 107 kg (235 lb 14.3 oz) SpO2 100% Breastfeeding No  
BMI 35.87 kg/m² General:          Elderly patient,chronically ill apperaing HEENT:           Atraumatic, moist mucous membrane,EOMI Neck:               Supple, symmetrical,  thyroid: non tender Lungs:            b/l wheezing +. Heart:              Regular  rhythm,  No  murmur  1-2 + LE edema Abdomen:       Soft, non-tender. Not distended. Bowel sounds normal 
Extremities:     No cyanosis. No clubbing Skin:                Not pale. Not Jaundiced  No rashes Psych:             Not anxious or agitated. Neurologic:      Alert, moves all extremities, oriented X 3.  
 
CODE STATUS: 
X Full Code DNR Partial  
 Comfort Care Signed: Reno Malone MD 
Date of Service:  3/5/2020 
7:48 PM 
 

## 2024-02-26 NOTE — ASU PATIENT PROFILE, ADULT - NSICDXPASTSURGICALHX_GEN_ALL_CORE_FT
PAST SURGICAL HISTORY:  History of left mastoidectomy partial    Stented coronary artery      PAST SURGICAL HISTORY:  History of ankle surgery     History of left mastoidectomy partial    S/P nasal surgery     Stented coronary artery

## 2024-02-26 NOTE — ASU PATIENT PROFILE, ADULT - NSICDXPASTMEDICALHX_GEN_ALL_CORE_FT
PAST MEDICAL HISTORY:  Bilateral cataracts     CAD (coronary artery disease)     CRD (chronic renal disease), stage III     Hearing loss left ear    History of BPH     History of sinusitis     Hypertension     Nasal polyps     Osteoarthritis left knee     PAST MEDICAL HISTORY:  Bilateral cataracts     CAD (coronary artery disease)     CRD (chronic renal disease), stage III     Hearing loss left ear    History of BPH     History of sinusitis     Hypertension     Myocardial infarction     Nasal polyps     Osteoarthritis left knee

## 2024-02-27 ENCOUNTER — NON-APPOINTMENT (OUTPATIENT)
Age: 79
End: 2024-02-27

## 2024-02-27 ENCOUNTER — OUTPATIENT (OUTPATIENT)
Dept: OUTPATIENT SERVICES | Facility: HOSPITAL | Age: 79
LOS: 1 days | Discharge: HOME | End: 2024-02-27

## 2024-02-27 ENCOUNTER — APPOINTMENT (OUTPATIENT)
Dept: OPHTHALMOLOGY | Facility: AMBULATORY SURGERY CENTER | Age: 79
End: 2024-02-27
Payer: MEDICARE

## 2024-02-27 VITALS — HEIGHT: 64.96 IN | WEIGHT: 174.17 LBS

## 2024-02-27 VITALS
OXYGEN SATURATION: 95 % | RESPIRATION RATE: 16 BRPM | HEART RATE: 68 BPM | DIASTOLIC BLOOD PRESSURE: 61 MMHG | SYSTOLIC BLOOD PRESSURE: 121 MMHG

## 2024-02-27 DIAGNOSIS — Z98.890 OTHER SPECIFIED POSTPROCEDURAL STATES: Chronic | ICD-10-CM

## 2024-02-27 DIAGNOSIS — Z95.5 PRESENCE OF CORONARY ANGIOPLASTY IMPLANT AND GRAFT: Chronic | ICD-10-CM

## 2024-02-27 DIAGNOSIS — Z90.89 ACQUIRED ABSENCE OF OTHER ORGANS: Chronic | ICD-10-CM

## 2024-02-27 PROCEDURE — 66984 XCAPSL CTRC RMVL W/O ECP: CPT | Mod: RT

## 2024-02-27 PROCEDURE — V2787: CPT

## 2024-02-27 DEVICE — IMPLANTABLE DEVICE
Type: IMPLANTABLE DEVICE | Site: RIGHT | Status: NON-FUNCTIONAL
Removed: 2024-02-27

## 2024-02-27 RX ORDER — BUDESONIDE, MICRONIZED 100 %
2 POWDER (GRAM) MISCELLANEOUS
Refills: 0 | DISCHARGE

## 2024-02-27 RX ORDER — TROPICAMIDE 1 %
1 DROPS OPHTHALMIC (EYE)
Refills: 0 | Status: COMPLETED | OUTPATIENT
Start: 2024-02-27 | End: 2024-02-27

## 2024-02-27 RX ORDER — ACETAMINOPHEN 500 MG
1000 TABLET ORAL ONCE
Refills: 0 | Status: DISCONTINUED | OUTPATIENT
Start: 2024-02-27 | End: 2024-02-27

## 2024-02-27 RX ORDER — KETOROLAC TROMETHAMINE 0.5 %
1 DROPS OPHTHALMIC (EYE)
Refills: 0 | Status: COMPLETED | OUTPATIENT
Start: 2024-02-27 | End: 2024-02-27

## 2024-02-27 RX ORDER — SODIUM CHLORIDE 9 MG/ML
500 INJECTION, SOLUTION INTRAVENOUS
Refills: 0 | Status: DISCONTINUED | OUTPATIENT
Start: 2024-02-27 | End: 2024-02-27

## 2024-02-27 RX ORDER — BUPROPION HYDROCHLORIDE 150 MG/1
1 TABLET, EXTENDED RELEASE ORAL
Refills: 0 | DISCHARGE

## 2024-02-27 RX ORDER — PHENYLEPHRINE HCL 2.5 %
1 DROPS OPHTHALMIC (EYE)
Refills: 0 | Status: COMPLETED | OUTPATIENT
Start: 2024-02-27 | End: 2024-02-27

## 2024-02-27 RX ADMIN — Medication 1 DROP(S): at 14:05

## 2024-02-27 RX ADMIN — Medication 1 DROP(S): at 14:11

## 2024-02-27 RX ADMIN — Medication 1 DROP(S): at 14:00

## 2024-02-27 RX ADMIN — Medication 1 DROP(S): at 14:12

## 2024-02-27 NOTE — OPERATIVE REPORT - OPERATIVE RPOSRT DETAILS
SURGEON: Rena Davison    ASSISTANT: none    PRE-OP DIAGNOSIS: cataract OD    POST-OP DIAGNOSIS: Same    ANESTHESIA: MAC, local    PROCEDURE: cataract extraction with intraocular lens placement, right eye    SPECIMEN/TISSUE REMOVED: None    ESTIMATED BLOOD LOSS: < 1mL    COMPLICATIONS: None    PROCEDURE:    The patient was seen in the preoperative area. The risks, benefits, and alternatives to surgery were discussed. All questions were answered.  The patient was given standard preoperative drops. The patient was then brought to the operating room.  The horizontal axis along the corneal limbus was marked while the patient was in a sitting, upright position.  The patient's eye was then prepped and draped in the usual sterile fashion for ophthalmic surgery.    A lid speculum was used to expose the eye.  A paracentesis was created with an MVR blade at 10 o'clock hour from the temporal limbus in the clockwise direction.  Next, 1% Preservative-free Lidocaine was instilled into the anterior chamber followed by viscoat.  A clear corneal incision was created with the aid of a crescent blade and a 2.4 mm sharped tip keratome at the 8 o'clock position.  A cystotome and Utrata forceps was used to create a continuous curvilinear capsulorrhexis.  Balanced salt solution was used for hydro dissection.  The nucleus was then phacoemulsified and aspirated using a divide and conquer technique.  The remaining epinucleus and cortical material was aspirated from the eye.  The capsular bag was then reformed using provisc in anticipation of the introduction of an intraocular lens implant which is a [id number] +21.5D SN [id number] with the toric IOL along the 180 axis.  The implant was injected into the capsular bag.  After centration, the remaining viscoelastic material was removed using the IA handpiece.    The temporal clear corneal and paracentesis incisions were hydrated with balanced salt solution to achieve adequate watertight closure. The wounds were checked for leaks and found to be negative.    An antibiotic was instilled into the eye, and the eye was shielded.    The patient tolerated the procedure well and was transferred to the recovery room in stable condition. They received standard postoperative instructions and an appointment to follow up the next day.

## 2024-02-28 ENCOUNTER — NON-APPOINTMENT (OUTPATIENT)
Age: 79
End: 2024-02-28

## 2024-02-28 ENCOUNTER — APPOINTMENT (OUTPATIENT)
Dept: OPHTHALMOLOGY | Facility: CLINIC | Age: 79
End: 2024-02-28
Payer: MEDICARE

## 2024-02-28 PROBLEM — I21.9 ACUTE MYOCARDIAL INFARCTION, UNSPECIFIED: Chronic | Status: ACTIVE | Noted: 2024-02-27

## 2024-02-28 PROCEDURE — 99024 POSTOP FOLLOW-UP VISIT: CPT

## 2024-03-04 ENCOUNTER — APPOINTMENT (OUTPATIENT)
Dept: OPHTHALMOLOGY | Facility: CLINIC | Age: 79
End: 2024-03-04
Payer: MEDICARE

## 2024-03-04 ENCOUNTER — NON-APPOINTMENT (OUTPATIENT)
Age: 79
End: 2024-03-04

## 2024-03-04 PROCEDURE — 99024 POSTOP FOLLOW-UP VISIT: CPT

## 2024-03-07 RX ORDER — SODIUM CHLORIDE 9 MG/ML
1000 INJECTION, SOLUTION INTRAVENOUS
Refills: 0 | Status: DISCONTINUED | OUTPATIENT
Start: 2024-03-12 | End: 2024-03-12

## 2024-03-11 NOTE — ASU PATIENT PROFILE, ADULT - NSICDXPASTMEDICALHX_GEN_ALL_CORE_FT
PAST MEDICAL HISTORY:  Bilateral cataracts     CAD (coronary artery disease)     CRD (chronic renal disease), stage III     Hearing loss left ear    History of BPH     History of sinusitis     Hypertension     Myocardial infarction     Nasal polyps     Osteoarthritis left knee

## 2024-03-11 NOTE — ASU PATIENT PROFILE, ADULT - AS SC BRADEN FRICTION
Nutrition Progress Report    Patient here for Nutrition Education for Weight Management with diagnosis of   Diagnosis Information      Diagnosis    278.00 (ICD-9-CM) - E66.9 (ICD-10-CM) - Obesity (BMI 30-39.9)            .  Time spent with patient was 55 minutes  from 845 to 0940.   Order located in the patient's computer chart.  Relevant medical history reviewed.  The instruction was given to the patient    Anthropometric Measurements:  Estimated body mass index is 36.16 kg/m² as calculated from the following:    Height as of this encounter: 5' 10\" (1.778 m).    Weight as of this encounter: 114.3 kg (251 lb 15.8 oz).   Wt Readings from Last 4 Encounters:   03/09/23 114.3 kg (251 lb 15.8 oz)   02/20/23 110 kg (242 lb 8.1 oz)   02/17/23 112.3 kg (247 lb 9.2 oz)   02/09/23 112.3 kg (247 lb 9.2 oz)     Goal weight: 200-220#  Previous attempts at weight loss: watch what he eats, avoid sugar  Perceived barriers to weight loss: injury in 2018, pain    Labs:   WBC (K/mcL)   Date Value   07/18/2022 5.3     RBC (mil/mcL)   Date Value   07/18/2022 4.37 (L)     HCT (%)   Date Value   07/18/2022 41.3     HGB (g/dL)   Date Value   07/18/2022 13.8     PLT (K/mcL)   Date Value   07/18/2022 253     Sodium (mmol/L)   Date Value   07/18/2022 136     Potassium (mmol/L)   Date Value   07/18/2022 4.1     Chloride (mmol/L)   Date Value   07/18/2022 104     Glucose (mg/dL)   Date Value   07/18/2022 95     Calcium (mg/dL)   Date Value   07/18/2022 8.7     Carbon Dioxide (mmol/L)   Date Value   07/18/2022 27     BUN (mg/dL)   Date Value   07/18/2022 12     Creatinine (mg/dL)   Date Value   07/18/2022 1.04     Hemoglobin A1C (%)   Date Value   07/18/2022 5.7 (H)     Self-Reported Client History: Pt has gained 4.5# since his previous visit w/ writer, reports he started eating ice cream the past 2 weeks and not watching what he was eating. He states he felt better prior to gaining the weight back, and knows the importance of him losing the  weight again. Education provided on plate method, fiber foods, calorie density of foods.    Diet Hx: Pt is looking to lose weight loss. Pt had H-Pylori, was treated. Pt reports he is having difficulties with bloating, gas, and belching with eating. Pt had an injury in his back in 2018, has gone through PT.   Pt reports he doesn't always feel hungry and will eat once a day sometimes, other times feels hungry and will ignore his hunger because he doesn't want to feel GI discomfort and vomiting. Pt reports he saw a GI provider, received instructions for a low FODMAP diet and pantoprazole. He did not start the low FODMAP diet as it was too complicated per report. Writer requested a Celiac panel from his PCP to r/o Celiac disease. If his bloodwork comes back positive for celiac antibodies, then pt may want to consider getting the biopsies to r/o Celiac disease. If it comes back negative then patient would benefit from trialling the low FODMAP diet for 4 weeks with guided re-introduction of foods with writer. Education today provided on tracking PO intake and symptoms. Encouragement provided on activity in a swimming pool to reduce painful symptoms of activity.    Supplements/Medications:  Oxycodone  Pantoprazole  Rosuvastatin  Vitamin D (50,000 units)    Physical Activity:  Nothing purposeful  Pt has limitations to be physically active due to pain  Pt tries to walk around daily    Food and Nutrition Related History  Pt is including probiotics (saurkraut, pickled beets)  Type of food/meals: home prepared  Meal/Snack pattern: 1 meal, occasional snacks  Dining Out: (once monthly)  Intermittent [id number] eating window, doesn't eat frequently d/t feeling bloated & vomiting  Fruits: 2 servings 3-4 days weekly  Vegetable: salad, cucumbers, daily (1-2 serving)  Wake up: 0530, sleep: 9179-4988, pt has sleep apnea, uses CPAP  Meal  Content   1st meal Time: 5976-5759  Food choice: pork neck & saurkraut OR sweet potato,  bell pepper, 2-3 eggs, krause grease OR 2 cans tuna, 2 cucumbers, onion, bell pepper, olive oil OR 2/3 of a frozen pizza  Beverage: water   Snacks/Desserts 1-2 avocado & 3-4 bananas  Pretzels, popcorn w/ butter & salt, ice cream, cashews, pistachios, fruits, vegetables   Oral Fluids Coffee w/ flavored creamer/ powdered coffee mate & 1 tsp sugar, water  ETOH: none     Nutrition Assessment:  Carbohydrate is poorly controlled.  Irregular meal distribution and intake  Skipped meals/snacks  Pt reports GI symptoms with eating     Nutrition Diagnosis:  Food and nutrition knowledge deficit related to inadequate and excessive caloric intake as evidenced by self-reported diet recall  Overweight/Obesity related to undesirable food choices, excessive caloric intake and limited/no physical activity as evidenced by self-reported diet recall and BMI:36.7  Altered GI fn r/t unknown etiology aeb pt report     Nutrition Prescription:   Mediterranean Style Diet: Emphasis on vegetables, fruits, and whole grains; includes low-fat dairy products, poultry, fish, legumes, non-tropical vegetable oils and nuts; and limits intake of sweets, sweetened beverages, and red meats.    Interventions:  ? Comprehensive Nutrition Education focused on skill development of identification of food groups, standard portions from each food group, label reading and recommended modifications in current eating habits  ? Nutrition counseling based on Cognitive behavioral theory using self-monitoring to eat more regularly, trying for 3 times daily    Monitoring/Evaluation:  ? Verbalized readiness to change nutrition related behaviors - contemplation  ? Plan to evaluate weight change of 1-2 pounds/week until next visit  ? Plan to evaluate food intake (number of food groups servings) per nutrition prescription through self reported adherence score of 75%    ? Plan to evaluate ability to engage in physical activity to be assessed at a future appointment  ? Plan to  evaluate pt’s ability to follow prescribed meal/snack pattern through self-reported adherence score of 75%  Plan to evaluate self management as agreed upon I will track my food intake and symptoms daily. through self-reported adherence score of 0% Pt needs new goal    Resources Issued:   AVS  ADA healthy plate method  recipe    Plan:  Chart routed to referring Provider Shoaib Cummings MD  See Patient Instructions for further information     Recommended Follow-up:  Pt to follow up with nutrition education in a one on one visit.  The patient was encouraged to call back with any questions or concerns.    Referring Provider: Shoaib Cummings MD  Service Provider: Frances Roblero RDN, CD [facility]  Onsite Medicaid provider Diana Short MD   (3) no apparent problem

## 2024-03-11 NOTE — ASU PATIENT PROFILE, ADULT - NSICDXPASTSURGICALHX_GEN_ALL_CORE_FT
PAST SURGICAL HISTORY:  Cataract Right    History of ankle surgery     History of left mastoidectomy partial    S/P nasal surgery     Stented coronary artery

## 2024-03-11 NOTE — ASU PATIENT PROFILE, ADULT - FALL HARM RISK - UNIVERSAL INTERVENTIONS
Bed in lowest position, wheels locked, appropriate side rails in place/Call bell, personal items and telephone in reach/Instruct patient to call for assistance before getting out of bed or chair/Non-slip footwear when patient is out of bed/[place] to call system/Physically safe environment - no spills, clutter or unnecessary equipment/Purposeful Proactive Rounding/Room/bathroom lighting operational, light cord in reach

## 2024-03-12 ENCOUNTER — NON-APPOINTMENT (OUTPATIENT)
Age: 79
End: 2024-03-12

## 2024-03-12 ENCOUNTER — OUTPATIENT (OUTPATIENT)
Dept: OUTPATIENT SERVICES | Facility: HOSPITAL | Age: 79
LOS: 1 days | Discharge: HOME | End: 2024-03-12

## 2024-03-12 ENCOUNTER — TRANSCRIPTION ENCOUNTER (OUTPATIENT)
Age: 79
End: 2024-03-12

## 2024-03-12 ENCOUNTER — APPOINTMENT (OUTPATIENT)
Dept: OPHTHALMOLOGY | Facility: AMBULATORY SURGERY CENTER | Age: 79
End: 2024-03-12
Payer: MEDICARE

## 2024-03-12 VITALS
TEMPERATURE: 97 F | DIASTOLIC BLOOD PRESSURE: 61 MMHG | SYSTOLIC BLOOD PRESSURE: 119 MMHG | HEART RATE: 61 BPM | RESPIRATION RATE: 16 BRPM

## 2024-03-12 VITALS
SYSTOLIC BLOOD PRESSURE: 143 MMHG | TEMPERATURE: 98 F | WEIGHT: 165.79 LBS | OXYGEN SATURATION: 97 % | HEART RATE: 63 BPM | RESPIRATION RATE: 16 BRPM | HEIGHT: 65 IN | DIASTOLIC BLOOD PRESSURE: 77 MMHG

## 2024-03-12 DIAGNOSIS — Z98.890 OTHER SPECIFIED POSTPROCEDURAL STATES: Chronic | ICD-10-CM

## 2024-03-12 DIAGNOSIS — H26.9 UNSPECIFIED CATARACT: Chronic | ICD-10-CM

## 2024-03-12 DIAGNOSIS — Z95.5 PRESENCE OF CORONARY ANGIOPLASTY IMPLANT AND GRAFT: Chronic | ICD-10-CM

## 2024-03-12 DIAGNOSIS — Z90.89 ACQUIRED ABSENCE OF OTHER ORGANS: Chronic | ICD-10-CM

## 2024-03-12 PROCEDURE — V2787: CPT

## 2024-03-12 PROCEDURE — 66984 XCAPSL CTRC RMVL W/O ECP: CPT | Mod: 79,LT

## 2024-03-12 DEVICE — IMPLANTABLE DEVICE
Type: IMPLANTABLE DEVICE | Site: LEFT | Status: NON-FUNCTIONAL
Removed: 2024-03-12

## 2024-03-12 RX ORDER — ROSUVASTATIN CALCIUM 5 MG/1
1 TABLET ORAL
Refills: 0 | DISCHARGE

## 2024-03-12 RX ORDER — BUPROPION HYDROCHLORIDE 150 MG/1
1 TABLET, EXTENDED RELEASE ORAL
Refills: 0 | DISCHARGE

## 2024-03-12 RX ORDER — TROPICAMIDE 1 %
1 DROPS OPHTHALMIC (EYE)
Refills: 0 | Status: COMPLETED | OUTPATIENT
Start: 2024-03-12 | End: 2024-03-12

## 2024-03-12 RX ORDER — FINASTERIDE 5 MG/1
1 TABLET, FILM COATED ORAL
Refills: 0 | DISCHARGE

## 2024-03-12 RX ORDER — TAMSULOSIN HYDROCHLORIDE 0.4 MG/1
1 CAPSULE ORAL
Refills: 0 | DISCHARGE

## 2024-03-12 RX ORDER — DULOXETINE HYDROCHLORIDE 30 MG/1
1 CAPSULE, DELAYED RELEASE ORAL
Refills: 0 | DISCHARGE

## 2024-03-12 RX ORDER — PHENYLEPHRINE HCL 2.5 %
1 DROPS OPHTHALMIC (EYE)
Refills: 0 | Status: COMPLETED | OUTPATIENT
Start: 2024-03-12 | End: 2024-03-12

## 2024-03-12 RX ORDER — CARVEDILOL PHOSPHATE 80 MG/1
1 CAPSULE, EXTENDED RELEASE ORAL
Refills: 0 | DISCHARGE

## 2024-03-12 RX ORDER — LISINOPRIL/HYDROCHLOROTHIAZIDE 10-12.5 MG
1 TABLET ORAL
Refills: 0 | DISCHARGE

## 2024-03-12 RX ADMIN — Medication 1 DROP(S): at 08:30

## 2024-03-12 RX ADMIN — Medication 1 DROP(S): at 08:25

## 2024-03-12 RX ADMIN — Medication 1 DROP(S): at 08:35

## 2024-03-12 NOTE — OPERATIVE REPORT - OPERATIVE RPOSRT DETAILS
SURGEON: Rena Davison    ASSISTANT: none    PRE-OP DIAGNOSIS: cataract OS    POST-OP DIAGNOSIS: Same    ANESTHESIA: MAC, local    PROCEDURE: cataract extraction with intraocular lens placement, left eye    SPECIMEN/TISSUE REMOVED: None    ESTIMATED BLOOD LOSS: < 1mL    COMPLICATIONS: None    PROCEDURE:    The patient was seen in the preoperative area. The risks, benefits, and alternatives to surgery were discussed. All questions were answered.  The patient was given standard preoperative drops. The patient was then brought to the operating room.  The horizontal axis of the corneal limbus was marked while the patient was in an upright, sitting position.  The patient was then prepped and draped in the usual sterile fashion for ophthalmic surgery.    A lid speculum was used to expose the eye.  The 170 axis was marked using a Mastel toric marker. A paracentesis was created with an MVR blade at 4 o'clock hour from the temporal limbus in the clockwise direction.  Next, 1% Preservative-free Lidocaine was instilled into the anterior chamber followed by viscoat.  A clear corneal incision was created with the aid of a crescent blade and a 2.4 mm sharped tip keratome at the 2 o'clock position.  A cystotome and Utrata forceps was used to create a continuous curvilinear capsulorrhexis.  Balanced salt solution was used for hydro dissection.  The nucleus was then phacoemulsified and aspirated using a divide and conquer technique.  The remaining epinucleus and cortical material was aspirated from the eye.  The capsular bag was then reformed using provisc in anticipation and an ORA (Optiview Reponse Analyzer) reading was done to assist with the selection of the implant.  An intraocular lens implant which is a [id number] +23.0D SN [id number] was placed in the capsular bag aligned along the 170 axis.  The implant was injected into the capsular bag.  After centration, the remaining viscoelastic material was removed using the IA handpiece.    The temporal clear corneal and paracentesis incisions were hydrated with balanced salt solution to achieve adequate watertight closure. The wounds were checked for leaks and found to be negative.    An antibiotic was instilled into the eye, and the eye was shielded.    The patient tolerated the procedure well and was transferred to the recovery room in stable condition. They received standard postoperative instructions and an appointment to follow up the next day.

## 2024-03-12 NOTE — ASU DISCHARGE PLAN (ADULT/PEDIATRIC) - NS DC INTERPRETER YES NO
No DISPLAY PLAN FREE TEXT DISPLAY PLAN FREE TEXT DISPLAY PLAN FREE TEXT DISPLAY PLAN FREE TEXT DISPLAY PLAN FREE TEXT DISPLAY PLAN FREE TEXT

## 2024-03-13 ENCOUNTER — NON-APPOINTMENT (OUTPATIENT)
Age: 79
End: 2024-03-13

## 2024-03-13 ENCOUNTER — APPOINTMENT (OUTPATIENT)
Dept: OPHTHALMOLOGY | Facility: CLINIC | Age: 79
End: 2024-03-13

## 2024-03-13 ENCOUNTER — APPOINTMENT (OUTPATIENT)
Dept: OPHTHALMOLOGY | Facility: CLINIC | Age: 79
End: 2024-03-13
Payer: MEDICARE

## 2024-03-13 PROCEDURE — 99024 POSTOP FOLLOW-UP VISIT: CPT

## 2024-04-04 ENCOUNTER — APPOINTMENT (OUTPATIENT)
Dept: OPHTHALMOLOGY | Facility: CLINIC | Age: 79
End: 2024-04-04
Payer: MEDICARE

## 2024-04-04 ENCOUNTER — NON-APPOINTMENT (OUTPATIENT)
Age: 79
End: 2024-04-04

## 2024-04-04 PROCEDURE — 99024 POSTOP FOLLOW-UP VISIT: CPT

## 2024-04-25 ENCOUNTER — APPOINTMENT (OUTPATIENT)
Dept: OTOLARYNGOLOGY | Facility: CLINIC | Age: 79
End: 2024-04-25
Payer: MEDICARE

## 2024-04-25 DIAGNOSIS — D33.3 BENIGN NEOPLASM OF CRANIAL NERVES: ICD-10-CM

## 2024-04-25 DIAGNOSIS — J32.9 CHRONIC SINUSITIS, UNSPECIFIED: ICD-10-CM

## 2024-04-25 DIAGNOSIS — R42 DIZZINESS AND GIDDINESS: ICD-10-CM

## 2024-04-25 DIAGNOSIS — J33.9 CHRONIC SINUSITIS, UNSPECIFIED: ICD-10-CM

## 2024-04-25 DIAGNOSIS — H90.3 SENSORINEURAL HEARING LOSS, BILATERAL: ICD-10-CM

## 2024-04-25 PROCEDURE — 99214 OFFICE O/P EST MOD 30 MIN: CPT | Mod: 25

## 2024-04-25 PROCEDURE — 31231 NASAL ENDOSCOPY DX: CPT

## 2024-05-02 ENCOUNTER — APPOINTMENT (OUTPATIENT)
Dept: MRI IMAGING | Facility: CLINIC | Age: 79
End: 2024-05-02
Payer: MEDICARE

## 2024-05-02 PROCEDURE — 70553 MRI BRAIN STEM W/O & W/DYE: CPT

## 2024-05-02 PROCEDURE — A9585: CPT

## 2024-05-04 NOTE — ASSESSMENT
[FreeTextEntry1] :  It was my impression that he has the acoustic neuroma and the feeling that his balance is getting worse.  I suggested repeating his imaging and asked him to bring a copy of his last MR with him for comparison. He has the history of the recurrent pansinusitis and nasal polyposis and is doing well on budesonide at this point.  We discussed at length the options of Dupixent and he was concerned about the pricing and as long as he is doing well with the rinses maize stay with just the budesonide.  I explained that it would be my usual protocol. In any case he is doing quite well and I will see him back in follow-up in 3 months. I suggested vestibular therapy in addition for his balance

## 2024-05-04 NOTE — PHYSICAL EXAM
[FreeTextEntry1] :  The patient was alert and oriented and in no distress. Voice was clear.  Face: The patient had no facial asymmetry or mass. The skin was unremarkable.  Eyes: The pupils were equal round and reactive to light and accommodation. There was no significant nystagmus or disconjugate gaze noted.  Nose:  The external nose had no significant deformity.  There was no facial tenderness.  On anterior rhinoscopy, the nasal mucosa was clear.  The anterior septum was midline.  There were no visualized polyps purulence  or masses.  Oral cavity: The oral mucosa was normal. The oral and base of tongue were clear and without mass. The gingival and buccal mucosa were moist and without lesions. The palate moved well. There was no cleft to the palate. There appeared to be good salivary flow.   There was no pus, erythema or mass in the oral cavity.   Ears: The external ears were normal without deformity. The ear canals were clear. The tympanic membranes were intact and normal.  Neck:  The neck was symmetrical. The parotid and submandibular glands were normal without masses. The trachea was midline and there was no unusual crepitus. The thyroid was smooth and nontender and no masses were palpated. There was no significant cervical adenopathy.   Neuro: Neurologically, the patient was awake, alert, and oriented to person, place and time. There were no obvious focal neurologic abnormalities.  Cranial nerves II through XII were grossly intact.   TMJ: The temporomandibular joints were nontender. There was no abnormal crepitus and no significant malocclusion   Nasal endoscopy:  CPT 58497 Procedure Note:  Endoscopy was done with Covid precautions and with video. All risks and benefits were discussed with the patient and consent obtained.  Nasal endoscopy was done with topical anesthesia of Pontocaine and Afrin and a      nasal endoscope. Indication: Nasal congestion, rule out sinusitis. Procedure: The nasal cavity was anesthetized with topical Afrin and Pontocaine. An  endoscope was used and inserted into the nasal cavity. Attention was first paid to the anterior nasal cavity. Endocoscopy was performed to inspect the interior of the nasal cavity, the nasal septum,  the middle and superior meati, the inferior, middle and superior turbinates, and the spheno-ethmoidal  recesses, the nasopharynx and eustachian tube orifices bilaterally. including the nasal mocosa, the possibility of polyps and the consistency of the nasal mucous. All findings were normal except:  The nasal mucosa is boggy he has somewhat lateralized middle turbinates but there is no significant polypoid disease and he has narrowing of the ostiomeatal units  He had a recent upper respiratory tract infection

## 2024-05-04 NOTE — HISTORY OF PRESENT ILLNESS
[de-identified] : HANNAH Bettencourt seen in follow-up on April 25.  He has the history of the pansinusitis with pansinus polyposis postsurgery.  He is using budesonide and notes that he is doing fairly well at this point.  He also has the history of the acoustic neuroma that has not been operated on and has [facility].  He notes that his balance is getting worse in spite of doing sara chi and he wanted to have this further evaluated.  The patient had no other ear nose or throat complaints at this visit.

## 2024-10-01 ENCOUNTER — APPOINTMENT (OUTPATIENT)
Dept: OTOLARYNGOLOGY | Facility: CLINIC | Age: 79
End: 2024-10-01
Payer: MEDICARE

## 2024-10-01 VITALS — WEIGHT: 170 LBS | HEIGHT: 65 IN | BODY MASS INDEX: 28.32 KG/M2

## 2024-10-01 DIAGNOSIS — D33.3 BENIGN NEOPLASM OF CRANIAL NERVES: ICD-10-CM

## 2024-10-01 DIAGNOSIS — J33.9 CHRONIC SINUSITIS, UNSPECIFIED: ICD-10-CM

## 2024-10-01 DIAGNOSIS — H90.3 SENSORINEURAL HEARING LOSS, BILATERAL: ICD-10-CM

## 2024-10-01 DIAGNOSIS — J32.9 CHRONIC SINUSITIS, UNSPECIFIED: ICD-10-CM

## 2024-10-01 DIAGNOSIS — R42 DIZZINESS AND GIDDINESS: ICD-10-CM

## 2024-10-01 PROCEDURE — 69210 REMOVE IMPACTED EAR WAX UNI: CPT

## 2024-10-01 PROCEDURE — 31231 NASAL ENDOSCOPY DX: CPT

## 2024-10-01 PROCEDURE — 99214 OFFICE O/P EST MOD 30 MIN: CPT | Mod: 25

## 2024-10-01 NOTE — REASON FOR VISIT
[Subsequent Evaluation] : a subsequent evaluation for [FreeTextEntry2] : acoustic neuroma and nasal polyposis

## 2024-10-01 NOTE — HISTORY OF PRESENT ILLNESS
[de-identified] : HANNAH SOLOMON Was seen in follow-up on October 1.  He has the unoperated left sided acoustic neuroma that was stable on his most recent MRI.  He has the recurrent cerumen impactions.  He has the profound left-sided sensorineural hearing loss.  He has a history of pansinusitis and pansinus polyposis and he is using Dupixent and feels quite good.  His nasal airway is clear.  A little bit of thick mucus from time to time.  The patient had no other ear nose or throat complaints at this visit.

## 2024-10-01 NOTE — ASSESSMENT
[FreeTextEntry1] : It was my impression that he has the excellent result to Dupixent.  I recommend continuing on this and suggested nasal saline rinses.  He is using Mucinex and feels this helps and I felt he could continue this.  I did not repeat his hearing test at this point.  He has the left-sided acoustic neuroma that was stable on his imaging  It was my impression that the patient had a cerumen impaction that was cleared.  I recommended topical moisturizing.  I recommended avoiding Q-tips.  I reviewed aural hygiene and the role of cerumen with the patient.  I suggested a repeat visit in 6 months or earlier should the need arise.  He had not yet gotten a chance to go to vestibular therapy and I recommended this again

## 2024-10-01 NOTE — PHYSICAL EXAM
[FreeTextEntry1] :  The patient was alert and oriented and in no distress. Voice was clear.  Face: The patient had no facial asymmetry or mass. The skin was unremarkable.  Eyes: The pupils were equal round and reactive to light and accommodation. There was no significant nystagmus or disconjugate gaze noted.  Nose:  The external nose had no significant deformity.  There was no facial tenderness.  On anterior rhinoscopy, the nasal mucosa was clear.  The anterior septum was midline.  There were no visualized polyps purulence  or masses.  Oral cavity: The oral mucosa was normal. The oral and base of tongue were clear and without mass. The gingival and buccal mucosa were moist and without lesions. The palate moved well. There was no cleft to the palate. There appeared to be good salivary flow.   There was no pus, erythema or mass in the oral cavity.   Ears:  Procedure note:  Removal of Cerumen Impactions, both ears:  [id number] Both external ears were normal. There were symptomatic cerumen impactions in both ears.  These were cleared microscopically without trauma using both suction and curettes.  After clearing,  both ear canals were clear both eardrums were intact and mobile.    verbal consent was obtained and patient felt improvement after procedure  Neck:  The neck was symmetrical. The parotid and submandibular glands were normal without masses. The trachea was midline and there was no unusual crepitus. The thyroid was smooth and nontender and no masses were palpated. There was no significant cervical adenopathy.   Neuro: Neurologically, the patient was awake, alert, and oriented to person, place and time. There were no obvious focal neurologic abnormalities.  Cranial nerves II through XII were grossly intact.   TMJ: The temporomandibular joints were nontender. There was no abnormal crepitus and no significant malocclusion  Nasal endoscopy:  CPT 87195 Procedure Note:  Endoscopy was done with Covid precautions and with video. All risks and benefits were discussed with the patient and consent obtained.  Nasal endoscopy was done with topical anesthesia of Pontocaine and Afrin and a      nasal endoscope. Indication: Recurrent pansinusitis and pansinus polyposis evaluation of response to therapy Procedure: The nasal cavity was anesthetized with topical Afrin and Pontocaine. An  endoscope was used and inserted into the nasal cavity. Attention was first paid to the anterior nasal cavity. Endocoscopy was performed to inspect the interior of the nasal cavity, the nasal septum,  the middle and superior meati, the inferior, middle and superior turbinates, and the spheno-ethmoidal  recesses, the nasopharynx and eustachian tube orifices bilaterally. including the nasal mocosa, the possibility of polyps and the consistency of the nasal mucous.  The nasal mucosa looked excellent without evidence of recurrent polypoid disease.  The left middle turbinate was somewhat lateralized  All findings were normal except:

## 2025-03-25 ENCOUNTER — NON-APPOINTMENT (OUTPATIENT)
Age: 80
End: 2025-03-25

## 2025-03-25 ENCOUNTER — APPOINTMENT (OUTPATIENT)
Dept: OTOLARYNGOLOGY | Facility: CLINIC | Age: 80
End: 2025-03-25
Payer: MEDICARE

## 2025-03-25 DIAGNOSIS — D33.3 BENIGN NEOPLASM OF CRANIAL NERVES: ICD-10-CM

## 2025-03-25 DIAGNOSIS — J33.9 CHRONIC SINUSITIS, UNSPECIFIED: ICD-10-CM

## 2025-03-25 DIAGNOSIS — H61.23 IMPACTED CERUMEN, BILATERAL: ICD-10-CM

## 2025-03-25 DIAGNOSIS — J32.9 CHRONIC SINUSITIS, UNSPECIFIED: ICD-10-CM

## 2025-03-25 DIAGNOSIS — H90.3 SENSORINEURAL HEARING LOSS, BILATERAL: ICD-10-CM

## 2025-03-25 DIAGNOSIS — R42 DIZZINESS AND GIDDINESS: ICD-10-CM

## 2025-03-25 DIAGNOSIS — J34.2 DEVIATED NASAL SEPTUM: ICD-10-CM

## 2025-03-25 DIAGNOSIS — J34.3 HYPERTROPHY OF NASAL TURBINATES: ICD-10-CM

## 2025-03-25 PROCEDURE — 99214 OFFICE O/P EST MOD 30 MIN: CPT | Mod: 25

## 2025-03-25 PROCEDURE — 31231 NASAL ENDOSCOPY DX: CPT

## 2025-03-25 PROCEDURE — G0268 REMOVAL OF IMPACTED WAX MD: CPT

## 2025-03-25 RX ORDER — BUDESONIDE 0.5 MG/2ML
0.5 INHALANT ORAL TWICE DAILY
Qty: 2 | Refills: 3 | Status: ACTIVE | COMMUNITY
Start: 2025-03-25 | End: 1900-01-01

## 2025-03-25 RX ORDER — CEFUROXIME AXETIL 250 MG/1
250 TABLET ORAL
Qty: 20 | Refills: 1 | Status: ACTIVE | COMMUNITY
Start: 2025-03-25 | End: 1900-01-01

## 2025-03-27 RX ORDER — BUDESONIDE 0.5 MG/2ML
0.5 INHALANT ORAL TWICE DAILY
Qty: 6 | Refills: 3 | Status: ACTIVE | COMMUNITY
Start: 2025-03-25 | End: 1900-01-01

## 2025-03-31 LAB — EAR NOSE AND THROAT CULTURE: ABNORMAL

## 2025-04-15 ENCOUNTER — APPOINTMENT (OUTPATIENT)
Dept: OTOLARYNGOLOGY | Facility: CLINIC | Age: 80
End: 2025-04-15
Payer: MEDICARE

## 2025-04-15 DIAGNOSIS — K21.9 GASTRO-ESOPHAGEAL REFLUX DISEASE W/OUT ESOPHAGITIS: ICD-10-CM

## 2025-04-15 DIAGNOSIS — J32.9 CHRONIC SINUSITIS, UNSPECIFIED: ICD-10-CM

## 2025-04-15 DIAGNOSIS — J33.9 CHRONIC SINUSITIS, UNSPECIFIED: ICD-10-CM

## 2025-04-15 DIAGNOSIS — D33.3 BENIGN NEOPLASM OF CRANIAL NERVES: ICD-10-CM

## 2025-04-15 PROCEDURE — 31231 NASAL ENDOSCOPY DX: CPT

## 2025-04-15 PROCEDURE — 99214 OFFICE O/P EST MOD 30 MIN: CPT | Mod: 25

## 2025-04-15 RX ORDER — FAMOTIDINE 40 MG/1
40 TABLET, FILM COATED ORAL
Qty: 30 | Refills: 4 | Status: ACTIVE | COMMUNITY
Start: 2025-04-15 | End: 1900-01-01

## 2025-05-27 ENCOUNTER — APPOINTMENT (OUTPATIENT)
Dept: OTOLARYNGOLOGY | Facility: CLINIC | Age: 80
End: 2025-05-27
Payer: MEDICARE

## 2025-05-27 DIAGNOSIS — H90.3 SENSORINEURAL HEARING LOSS, BILATERAL: ICD-10-CM

## 2025-05-27 DIAGNOSIS — J32.9 CHRONIC SINUSITIS, UNSPECIFIED: ICD-10-CM

## 2025-05-27 DIAGNOSIS — D33.3 BENIGN NEOPLASM OF CRANIAL NERVES: ICD-10-CM

## 2025-05-27 DIAGNOSIS — J33.9 CHRONIC SINUSITIS, UNSPECIFIED: ICD-10-CM

## 2025-05-27 DIAGNOSIS — K21.9 GASTRO-ESOPHAGEAL REFLUX DISEASE W/OUT ESOPHAGITIS: ICD-10-CM

## 2025-05-27 PROCEDURE — 31231 NASAL ENDOSCOPY DX: CPT

## 2025-05-27 PROCEDURE — 99214 OFFICE O/P EST MOD 30 MIN: CPT | Mod: 25

## 2025-07-03 ENCOUNTER — RX RENEWAL (OUTPATIENT)
Age: 80
End: 2025-07-03

## (undated) DEVICE — SUT CHROMIC 4-0 18" G-3

## (undated) DEVICE — PACK ANTERIOR SEGMENT

## (undated) DEVICE — BLADE MEDTRONIC ENT RAD 40 DEGREE ROTATABLE 4MM X 11CM

## (undated) DEVICE — DRAPE MICROSCOPE KNOB COVER SMALL (2 PCS)

## (undated) DEVICE — SOL ANTI FOG

## (undated) DEVICE — ELCTR BOVIE SUCTION 8FR 6"

## (undated) DEVICE — Device

## (undated) DEVICE — SOL IRR BAG BSS 500ML

## (undated) DEVICE — VENODYNE/SCD SLEEVE CALF MEDIUM

## (undated) DEVICE — TUBING SUCTION NONCONDUCTIVE 6MM X 12FT

## (undated) DEVICE — DRAPE MEDIUM SHEET 44" X 70"

## (undated) DEVICE — NUCLEUS HYDRODISSECTOR PEARCE ANGLED 25G X 22MM

## (undated) DEVICE — PETRI DISH MED 3.5"

## (undated) DEVICE — SYR LUER LOK 3CC

## (undated) DEVICE — KNIFE ALCON SLIT INTREPID CLEAR-CUT SAFETY 2.4MM

## (undated) DEVICE — GLV 6.5 PROTEXIS (WHITE)

## (undated) DEVICE — GLV 8 PROTEXIS (WHITE)

## (undated) DEVICE — MEDTRONIC INFLATOR KIT FOR NUVENT EM SINUS DILATION SYSTEM

## (undated) DEVICE — BLADE MEDTRONIC ENT INFERIOR TURBINATE ROTATABLE STRAIGHT 2.9MM X 11CM

## (undated) DEVICE — BLADE MEDTRONIC ENT FUSION QUADCUT ROTATABLE STRAIGHT 4.3MM X 13CM

## (undated) DEVICE — PACK CENTURION 2.4MM

## (undated) DEVICE — DRSG TAPE MICROPORE 1"

## (undated) DEVICE — DRSG TELFA 3 X 8

## (undated) DEVICE — WARMING BLANKET LOWER ADULT

## (undated) DEVICE — KNIFE ALCON MVR V-LANCE 20G (WHITE)

## (undated) DEVICE — APPLICATOR COTTON TIP 3" STERILE

## (undated) DEVICE — MEDTRONIC INSTRUMENT TRACKER ENT

## (undated) DEVICE — GOWN ROYAL SILK XL

## (undated) DEVICE — TUBING IRRIGATION STRAIGHT SHOT

## (undated) DEVICE — PACK RHINOPLASTY

## (undated) DEVICE — MEDTRONIC AXIEM PATIENT TRACKER NON-INVASIVE

## (undated) DEVICE — SUT NYLON 10-0 12" CU-5

## (undated) DEVICE — SUT PLAIN GUT 4-0 18" SC-1

## (undated) DEVICE — NDL HYPO REGULAR BEVEL 25G X 1.5" (BLUE)

## (undated) DEVICE — TRANSFORMER INTREPID I/A 0.3MM

## (undated) DEVICE — GLV 7.5 PROTEXIS (WHITE)

## (undated) DEVICE — DRAPE MAYO STAND 23"